# Patient Record
(demographics unavailable — no encounter records)

---

## 2024-11-07 NOTE — PHYSICAL EXAM
[de-identified] : Constitutional; Appears well, no apparent distress\par  Ability to communicate: Normal \par  Respiratory: non-labored breathing\par  Skin: No rash noted\par  Head: Normocephalic, atraumatic\par  Neck: no visible thyroid enlargement\par  Eyes: Extraocular movements intact\par  Neurologic: Alert and oriented x3\par  Psychiatric: normal mood, affect and behavior\par   [] : non-antalgic

## 2024-11-07 NOTE — DISCUSSION/SUMMARY
[de-identified] : After discussing various treatment options with the patient including but not limited to oral medications, physical therapy, exercise modalities as well as interventional spinal injections, we have decided with the following plan:  - Continue Home exercises, stretching, activity modification, physical therapy, and conservative care. - MRI report and/or images was reviewed and discussed with the patient. - Recommend L5-S1 Lumbar Epidural Steroid Injection under fluoroscopic guidance with image. - The risks, benefits and alternatives of the proposed procedure were explained in detail with the patient. The risks outlined include but are not limited to infection, bleeding, post-dural puncture headache, nerve injury, a temporary increase in pain, failure to resolve symptoms, allergic reaction, symptom recurrence, and possible elevation of blood sugar in diabetics. All questions were answered to patient's apparent satisfaction and he/she verbalized an understanding. - Patient is presenting with acute/sub-acute radicular pain with impairment in ADLs and functionality.  The pain has not responded to conservative care including NSAID therapy and/or physical therapy.  There is no bleeding tendency, unstable medical condition, or systemic infection. - Follow up in 1-2 weeks post injection for re-evaluation.

## 2024-11-07 NOTE — HISTORY OF PRESENT ILLNESS
[Lower back] : lower back [4] : 4 [Sharp] : sharp [Throbbing] : throbbing [Intermittent] : intermittent [Household chores] : household chores [Nothing helps with pain getting better] : Nothing helps with pain getting better [Standing] : standing [Physical therapy] : physical therapy [Radiating] : radiating [FreeTextEntry1] : 11/07/2024: pt is following up for L-spine pain. Pain is on the b/l lower back L>R and sometimes radiates down the leg.   05/23/2024: s/p L5-S1 LESI on 05/09/24 with 50% relief and improvement of ADLs. No longer has any pain down the leg but still has left lower back pain.  01/25/2024: s/p L5-S1 LESI on 01/11/24 with >80% relief and improvement of ADLs. Has been feeling much better since injection.   09/21/2023: s/p L5-S1 LESI on 09/07/23 with 70% relief and improvement of ADLs.   06/15/2023: s/p L5-S1 LESI on 06/01/23 with >80% relief and improvement of ADLs. Pain no longer radiates down the left leg but still gets left sided back pain with driving and sitting for longer periods.   Initial HPI 04/27/23: Pain started several years ago and is on the LEFT lower back and radiates down the left leg to the toes described as a numbness/tingling.   MRI Lumbar Spine 12/7/22 independently reviewed: L3-4 left HNP with left L3 compression; L5-S1 left HNP with left S1 impingement.  Conservative Care: waiting for approval.  Pain Medications: Tylenol/Advil PRN  Past Injections: none Spine surgery: none  Blood thinners: none  [] : no [de-identified] : L MRI  [FreeTextEntry7] : B/L LEG  [TWNoteComboBox1] : 50%

## 2024-12-05 NOTE — HISTORY OF PRESENT ILLNESS
[Lower back] : lower back [4] : 4 [Radiating] : radiating [Sharp] : sharp [Throbbing] : throbbing [Intermittent] : intermittent [Household chores] : household chores [Nothing helps with pain getting better] : Nothing helps with pain getting better [Standing] : standing [Physical therapy] : physical therapy [FreeTextEntry1] : 12/5/24: s/p L5-S1 LESI on 11/21/24 with >80% relief and improvement of ADLs. No pain down the leg. Overall pain is improved.  . 11/07/2024: pt is following up for L-spine pain. Pain is on the b/l lower back L>R and sometimes radiates down the leg.   05/23/2024: s/p L5-S1 LESI on 05/09/24 with 50% relief and improvement of ADLs. No longer has any pain down the leg but still has left lower back pain.  01/25/2024: s/p L5-S1 LESI on 01/11/24 with >80% relief and improvement of ADLs. Has been feeling much better since injection.   09/21/2023: s/p L5-S1 LESI on 09/07/23 with 70% relief and improvement of ADLs.   06/15/2023: s/p L5-S1 LESI on 06/01/23 with >80% relief and improvement of ADLs. Pain no longer radiates down the left leg but still gets left sided back pain with driving and sitting for longer periods.   Initial HPI 04/27/23: Pain started several years ago and is on the LEFT lower back and radiates down the left leg to the toes described as a numbness/tingling.   MRI Lumbar Spine 12/7/22 independently reviewed: L3-4 left HNP with left L3 compression; L5-S1 left HNP with left S1 impingement.  Conservative Care: waiting for approval.  Pain Medications: Tylenol/Advil PRN  Past Injections: none Spine surgery: none  Blood thinners: none  [] : no [FreeTextEntry7] : B/L LEG  [de-identified] : L MRI  [TWNoteComboBox1] : 50%

## 2024-12-05 NOTE — DISCUSSION/SUMMARY
[de-identified] : After discussing various treatment options with the patient including but not limited to oral medications, physical therapy, exercise modalities as well as interventional spinal injections, we have decided with the following plan:  - Continue Home exercises, stretching, activity modification, physical therapy, and conservative care. - MRI report and/or images was reviewed and discussed with the patient. - Recommend L5-S1 Lumbar Epidural Steroid Injection under fluoroscopic guidance with image. - The risks, benefits and alternatives of the proposed procedure were explained in detail with the patient. The risks outlined include but are not limited to infection, bleeding, post-dural puncture headache, nerve injury, a temporary increase in pain, failure to resolve symptoms, allergic reaction, symptom recurrence, and possible elevation of blood sugar in diabetics. All questions were answered to patient's apparent satisfaction and he/she verbalized an understanding. - Patient is presenting with acute/sub-acute radicular pain with impairment in ADLs and functionality.  The pain has not responded to conservative care including NSAID therapy and/or physical therapy.  There is no bleeding tendency, unstable medical condition, or systemic infection. - Follow up in 1-2 weeks post injection for re-evaluation. - Will call to schedule.

## 2024-12-05 NOTE — PHYSICAL EXAM
[de-identified] : Constitutional; Appears well, no apparent distress\par  Ability to communicate: Normal \par  Respiratory: non-labored breathing\par  Skin: No rash noted\par  Head: Normocephalic, atraumatic\par  Neck: no visible thyroid enlargement\par  Eyes: Extraocular movements intact\par  Neurologic: Alert and oriented x3\par  Psychiatric: normal mood, affect and behavior\par   [] : non-antalgic

## 2025-02-27 NOTE — PROCEDURE
[FreeTextEntry3] : Date of Service: 02/27/2025   Account: [id number]  Patient: DANII HAMILTON   YOB: 1984  Age: 40 year   Surgeon:                                                         Arthur Chamorro D.O.  Pre-Operative Diagnosis:                             Lumbosacral radiculitis  Post-Operative Diagnosis:                           Same  Procedure:                                                      Interlaminar lumbar epidural steroid injection (L5-S1) under fluoroscopic guidance  Anesthesia:                                                     Local with MAC   This procedure was carried out using fluoroscopic guidance.  The risks and benefits of the procedure were discussed extensively with the patient.  The consent of the patient was obtained and the following procedure was performed.  The patient was placed in the prone position.  The lumbar area was prepped and draped in a sterile fashion.  A timeout was performed with all essential staff present and the site and side were verified. Under AP view with slight cephalad-caudad angulation, the L5-S1 interspace was identified and marked.  Using sterile technique, the superficial skin was anesthetized with 1% Lidocaine without epinephrine.  A 20-gauge Tuohy needle was advanced into the epidural space under fluoroscopy using [id number]-[id number] technique and using loss of resistance at the L5-S1 level.  After negative aspiration for heme or CSF, an epidurogram was obtained using 2-3 cc Omnipaque contrast injected under live fluoroscopy, confirming epidural placement of the needle.    Epidurogram showed no evidence of intrathecal or intravascular flow, and good evidence of bilateral epidural flow from L3-S2 levels.  After this, 4 cc of preservative free normal saline plus 12 mg of betamethasone were injected into the epidural space.  The needle was subsequently removed.  Anesthesia personnel were present throughout the procedure.  The patient tolerated the procedure well and was instructed to contact me immediately if there were any problems.  Arthur Chaomrro D.O.

## 2025-03-13 NOTE — HISTORY OF PRESENT ILLNESS
[Lower back] : lower back [4] : 4 [Radiating] : radiating [Sharp] : sharp [Throbbing] : throbbing [Intermittent] : intermittent [Household chores] : household chores [Nothing helps with pain getting better] : Nothing helps with pain getting better [Standing] : standing [Physical therapy] : physical therapy [FreeTextEntry1] : 03/13/2025: s/p L5-S1 LESI on 02/27/25 with 50% relief and improvement of ADLs. Pain now just on the left lower back but less severe.   12/5/24: s/p L5-S1 LESI on 11/21/24 with >80% relief and improvement of ADLs. No pain down the leg. Overall pain is improved.  . 11/07/2024: pt is following up for L-spine pain. Pain is on the b/l lower back L>R and sometimes radiates down the leg.   05/23/2024: s/p L5-S1 LESI on 05/09/24 with 50% relief and improvement of ADLs. No longer has any pain down the leg but still has left lower back pain.  01/25/2024: s/p L5-S1 LESI on 01/11/24 with >80% relief and improvement of ADLs. Has been feeling much better since injection.   09/21/2023: s/p L5-S1 LESI on 09/07/23 with 70% relief and improvement of ADLs.   06/15/2023: s/p L5-S1 LESI on 06/01/23 with >80% relief and improvement of ADLs. Pain no longer radiates down the left leg but still gets left sided back pain with driving and sitting for longer periods.   Initial HPI 04/27/23: Pain started several years ago and is on the LEFT lower back and radiates down the left leg to the toes described as a numbness/tingling.   MRI Lumbar Spine 12/7/22 independently reviewed: L3-4 left HNP with left L3 compression; L5-S1 left HNP with left S1 impingement.  Conservative Care: waiting for approval.  Pain Medications: Tylenol/Advil PRN  Past Injections: none Spine surgery: none  Blood thinners: none  [] : no [FreeTextEntry7] : B/L LEG  [de-identified] : L MRI  [TWNoteComboBox1] : 50%

## 2025-03-13 NOTE — HISTORY OF PRESENT ILLNESS
[Lower back] : lower back [4] : 4 [Radiating] : radiating [Sharp] : sharp [Throbbing] : throbbing [Intermittent] : intermittent [Household chores] : household chores [Nothing helps with pain getting better] : Nothing helps with pain getting better [Standing] : standing [Physical therapy] : physical therapy [FreeTextEntry1] : 03/13/2025: s/p L5-S1 LESI on 02/27/25 with 50% relief and improvement of ADLs. Pain now just on the left lower back but less severe.   12/5/24: s/p L5-S1 LESI on 11/21/24 with >80% relief and improvement of ADLs. No pain down the leg. Overall pain is improved.  . 11/07/2024: pt is following up for L-spine pain. Pain is on the b/l lower back L>R and sometimes radiates down the leg.   05/23/2024: s/p L5-S1 LESI on 05/09/24 with 50% relief and improvement of ADLs. No longer has any pain down the leg but still has left lower back pain.  01/25/2024: s/p L5-S1 LESI on 01/11/24 with >80% relief and improvement of ADLs. Has been feeling much better since injection.   09/21/2023: s/p L5-S1 LESI on 09/07/23 with 70% relief and improvement of ADLs.   06/15/2023: s/p L5-S1 LESI on 06/01/23 with >80% relief and improvement of ADLs. Pain no longer radiates down the left leg but still gets left sided back pain with driving and sitting for longer periods.   Initial HPI 04/27/23: Pain started several years ago and is on the LEFT lower back and radiates down the left leg to the toes described as a numbness/tingling.   MRI Lumbar Spine 12/7/22 independently reviewed: L3-4 left HNP with left L3 compression; L5-S1 left HNP with left S1 impingement.  Conservative Care: waiting for approval.  Pain Medications: Tylenol/Advil PRN  Past Injections: none Spine surgery: none  Blood thinners: none  [] : no [FreeTextEntry7] : B/L LEG  [de-identified] : L MRI  [TWNoteComboBox1] : 50%

## 2025-03-13 NOTE — PHYSICAL EXAM
[de-identified] : Constitutional; Appears well, no apparent distress\par  Ability to communicate: Normal \par  Respiratory: non-labored breathing\par  Skin: No rash noted\par  Head: Normocephalic, atraumatic\par  Neck: no visible thyroid enlargement\par  Eyes: Extraocular movements intact\par  Neurologic: Alert and oriented x3\par  Psychiatric: normal mood, affect and behavior\par   [] : non-antalgic

## 2025-03-13 NOTE — DISCUSSION/SUMMARY
[de-identified] : After discussing various treatment options with the patient including but not limited to oral medications, physical therapy, exercise modalities as well as interventional spinal injections, we have decided with the following plan:  - Continue Home exercises, stretching, activity modification, physical therapy, and conservative care. - MRI report and/or images was reviewed and discussed with the patient. - Recommend L5-S1 Lumbar Epidural Steroid Injection under fluoroscopic guidance with image. (left)  - The risks, benefits and alternatives of the proposed procedure were explained in detail with the patient. The risks outlined include but are not limited to infection, bleeding, post-dural puncture headache, nerve injury, a temporary increase in pain, failure to resolve symptoms, allergic reaction, symptom recurrence, and possible elevation of blood sugar in diabetics. All questions were answered to patient's apparent satisfaction and he/she verbalized an understanding. - Patient is presenting with acute/sub-acute radicular pain with impairment in ADLs and functionality.  The pain has not responded to conservative care including NSAID therapy and/or physical therapy.  There is no bleeding tendency, unstable medical condition, or systemic infection. - Follow up in 1-2 weeks post injection for re-evaluation. - Will call to schedule.

## 2025-03-13 NOTE — PHYSICAL EXAM
[de-identified] : Constitutional; Appears well, no apparent distress\par  Ability to communicate: Normal \par  Respiratory: non-labored breathing\par  Skin: No rash noted\par  Head: Normocephalic, atraumatic\par  Neck: no visible thyroid enlargement\par  Eyes: Extraocular movements intact\par  Neurologic: Alert and oriented x3\par  Psychiatric: normal mood, affect and behavior\par   [] : non-antalgic

## 2025-03-13 NOTE — DISCUSSION/SUMMARY
[de-identified] : After discussing various treatment options with the patient including but not limited to oral medications, physical therapy, exercise modalities as well as interventional spinal injections, we have decided with the following plan:  - Continue Home exercises, stretching, activity modification, physical therapy, and conservative care. - MRI report and/or images was reviewed and discussed with the patient. - Recommend L5-S1 Lumbar Epidural Steroid Injection under fluoroscopic guidance with image. (left)  - The risks, benefits and alternatives of the proposed procedure were explained in detail with the patient. The risks outlined include but are not limited to infection, bleeding, post-dural puncture headache, nerve injury, a temporary increase in pain, failure to resolve symptoms, allergic reaction, symptom recurrence, and possible elevation of blood sugar in diabetics. All questions were answered to patient's apparent satisfaction and he/she verbalized an understanding. - Patient is presenting with acute/sub-acute radicular pain with impairment in ADLs and functionality.  The pain has not responded to conservative care including NSAID therapy and/or physical therapy.  There is no bleeding tendency, unstable medical condition, or systemic infection. - Follow up in 1-2 weeks post injection for re-evaluation. - Will call to schedule.
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
## 2025-04-17 NOTE — PHYSICAL EXAM
[Normal] : Gait: normal [SLR] : positive straight leg raise [LE] : Sensory: Intact in bilateral lower extremities [1+] : left ankle jerk 1+ [DP] : dorsalis pedis 2+ and symmetric bilaterally [Plantar Reflex Right Only] : absent on the right [Plantar Reflex Left Only] : absent on the left [DTR Reflexes Clonus Of Right Ankle (___ Beats)] : absent on the right [DTR Reflexes Clonus Of Left Ankle (___ Beats)] : absent on the left [de-identified] : left gluteal tenderness psoriatic patches legs and arms, extensor surface left knee [de-identified] : 3 views lumbar spine obtained today demonstrate slight trunk shift to the left.  Normal lumbar lordosis.  Trace retrolisthesis L5-S1.  Degenerative changes with some endplate sclerosis at the L2-3 level.  No dynamic instability between flexion-extension.  AP pelvis demonstrates normal appearance of bilaterally.  No acute fractures.  No significant degeneration

## 2025-04-17 NOTE — DISCUSSION/SUMMARY
[Medication Risks Reviewed] : Medication risks reviewed [de-identified] : Assessment: - Summary : The patient's symptoms point to a significant degenerative issue with his lower back, specifically a left-sided herniation that may be causing his symptoms.  Degenerative arthritis in the thoracolumbar spine may also be contributing to the problem. - Problems : - Chronic back pain  - Left-sided herniation  - Wear and tear arthritis  - Differential Diagnosis : - Sciatica  - Lumbar Strain  - Lumbar Disk Disorder  Plan: - Summary : Plan to order updated MRI and based on results, discuss potential surgical treatments including a possible microdiscectomy. The patient is also advised to follow-up with his dermatologist for his psoriasis. - Plan : - Order an updated MRI lumbar spine  - Patient to follow-up with dermatologist for psoriasis treatment  - Revisit following review of updated MRI results to discuss options including potential surgery  The patient was educated regarding their condition, treatment options as well as prescribed course of treatment.  Risks and benefits as well as alternatives to the proposed treatment were also provided to the patient  They were given the opportunity to have all their questions answered to their satisfaction.  Vital signs were reviewed with the patient and the patient was instructed to followup with their primary care provider for further management. There were no PAs or scribes used in the evaluation, exam or treatment plan discussion. The surgeon was the primary evaluating or treating physician as noted above.

## 2025-04-17 NOTE — DISCUSSION/SUMMARY
[Medication Risks Reviewed] : Medication risks reviewed [de-identified] : Assessment: - Summary : The patient's symptoms point to a significant degenerative issue with his lower back, specifically a left-sided herniation that may be causing his symptoms.  Degenerative arthritis in the thoracolumbar spine may also be contributing to the problem. - Problems : - Chronic back pain  - Left-sided herniation  - Wear and tear arthritis  - Differential Diagnosis : - Sciatica  - Lumbar Strain  - Lumbar Disk Disorder  Plan: - Summary : Plan to order updated MRI and based on results, discuss potential surgical treatments including a possible microdiscectomy. The patient is also advised to follow-up with his dermatologist for his psoriasis. - Plan : - Order an updated MRI lumbar spine  - Patient to follow-up with dermatologist for psoriasis treatment  - Revisit following review of updated MRI results to discuss options including potential surgery  The patient was educated regarding their condition, treatment options as well as prescribed course of treatment.  Risks and benefits as well as alternatives to the proposed treatment were also provided to the patient  They were given the opportunity to have all their questions answered to their satisfaction.  Vital signs were reviewed with the patient and the patient was instructed to followup with their primary care provider for further management. There were no PAs or scribes used in the evaluation, exam or treatment plan discussion. The surgeon was the primary evaluating or treating physician as noted above.

## 2025-04-17 NOTE — PHYSICAL EXAM
[Normal] : Gait: normal [SLR] : positive straight leg raise [LE] : Sensory: Intact in bilateral lower extremities [1+] : left ankle jerk 1+ [DP] : dorsalis pedis 2+ and symmetric bilaterally [Plantar Reflex Right Only] : absent on the right [Plantar Reflex Left Only] : absent on the left [DTR Reflexes Clonus Of Right Ankle (___ Beats)] : absent on the right [DTR Reflexes Clonus Of Left Ankle (___ Beats)] : absent on the left [de-identified] : left gluteal tenderness psoriatic patches legs and arms, extensor surface left knee [de-identified] : 3 views lumbar spine obtained today demonstrate slight trunk shift to the left.  Normal lumbar lordosis.  Trace retrolisthesis L5-S1.  Degenerative changes with some endplate sclerosis at the L2-3 level.  No dynamic instability between flexion-extension.  AP pelvis demonstrates normal appearance of bilaterally.  No acute fractures.  No significant degeneration

## 2025-05-23 NOTE — DISCUSSION/SUMMARY
[Medication Risks Reviewed] : Medication risks reviewed [de-identified] : Assessment: - Summary : Patient presents with lumbar radiculopathy due to L5-S1 disc herniation, confirmed by MRI. Patient has experienced temporary relief from epidural injection but anticipates symptom recurrence. The patient also has early degenerative changes in the lumbar spine. - Problems : - Lumbar radiculopathy  - L5-S1 disc herniation  - Early lumbar degenerative disc disease  - Differential Diagnosis : - Lumbar spinal stenosis  - Facet joint arthropathy  - Sacroiliac joint dysfunction  Plan: - Summary : After discussing treatment options, including continued conservative management and surgical intervention, the patient has decided to proceed with microdiscectomy (disc shaving) for the L4-L5 herniation. I have explained the procedure, expected outcomes, and potential risks, including the possibility of reherniation. Post-operative rehabilitation and activity modifications were also discussed. - Plan : - Schedule microdiscectomy for left L5-S1 herniation  - Obtain insurance authorization for surgery  - Provide patient with pre-operative information packet  - Arrange post-operative home physical therapy  - Educate patient on post-operative activity restrictions and proper body mechanics  - Follow-up appointment to be scheduled post-operatively  The patient was advised that based upon their clinical presentation and radiographic findings they meet inclusion criteria for spinal surgery to address their spinal pathology. Specifically, they have tried medications, therapy and/or injections without relief of symptoms. Given the duration of symptoms and failure to respond to non surgical treatments surgery was offered to the patient and they have decided to proceed with surgery.  The spectrum of treatments for their spinal condition were reviewed in detail. The goals, alternatives, risks and benefits of spinal surgery were reviewed in detail with the patient.    Benefits and risks of operative and nonoperative treatment for the patient's pathology were outlined at length with the patient.  Specifically, those risks are understood to be, but not limited to, bleeding, infection, fracture (both during surgery and after surgery), adjacent level disease, failure to heal (significantly increased by smoking), need for further surgery, failure of instrumentation (if used), recurrence of problem and symptoms, neurovascular injury, dural tears or leaks resulting in spinal fluid leakage and requiring additional invasive and non-invasive treatments, need for additional procedures, possible paralysis resulting in loss of use of arms, legs, bowel and bladder function as well as sexual dysfunction, and anesthetic risks including death.  Available alternatives to surgery were also discussed with the patient. In addition, the patient further understands that there may be indicated need for somatosensory evoked potential monitoring, real-time EMG monitoring, and motor evoked potential monitoring during the procedure along with placement of needle electrodes. A neuromonitoring service will discuss the risks and benefits separately with the patient. The patient also understands that having a surgical procedure and being hospitalized carries risks in addition to the ones described above.  The patient was advised that Dr. Porter operates as a surgical team with his associate Dr. Mccarthy and/or with surgical Physician Assistants (PA)  The patient was advised that they will require a medical preoperative risk evaluation by their PCP. Further medical subspecialty clearances such as cardiac may be indicated if felt needed by their PCP.  The patient was advised he/she may call our office after careful consideration of their treatment options and further consultation with any other physician to begin the process of preoperative planning for elective spinal surgery at a time of their choosing.  The patient verbalized an understanding of the procedure, its indications, and its common potential complications and attendant risks, and is willing to proceed. No guarantees were made with regard to a complete recovery. We will proceed in a timely manner after obtaining medical clearance.

## 2025-05-23 NOTE — PHYSICAL EXAM
[Normal] : Gait: normal [SLR] : positive straight leg raise [LE] : Sensory: Intact in bilateral lower extremities [1+] : left ankle jerk 1+ [DP] : dorsalis pedis 2+ and symmetric bilaterally [Plantar Reflex Right Only] : absent on the right [Plantar Reflex Left Only] : absent on the left [DTR Reflexes Clonus Of Right Ankle (___ Beats)] : absent on the right [DTR Reflexes Clonus Of Left Ankle (___ Beats)] : absent on the left [de-identified] : left gluteal tenderness psoriatic patches legs and arms, extensor surface left knee [de-identified] : EXAM: [id number] - MR SPINE LUMBAR - ORDERED BY: NATHAN BAUGH  PROCEDURE DATE: 04/24/2025  INTERPRETATION: CLINICAL INFORMATION: Left leg pain.  ADDITIONAL CLINICAL INFORMATION: Low back muscle strain S39.012A  TECHNIQUE: Multiplanar, multisequence MRI was performed of the lumbar spine. IV Contrast: NONE  PRIOR STUDIES: No priors available for comparison.  FINDINGS:  LOCALIZER: No additional findings. BONES: There is no fracture. There are Modic type I changes at the L4-L5 and L5-S1 level. DISCS: There is loss of normal T2 disc signal at the L2-L3, L4-5, L5-S1 level. ALIGNMENT: There is straightening of the lumbar spine. SACROILIAC JOINTS/SACRUM: There is no sacral fracture. The SI joints are partially visualized but are intact. CONUS AND CAUDA EQUINA: The distal cord and conus are normal in signal. Conus terminates at L1-L2. VISUALIZED INTRAPELVIC/INTRA-ABDOMINAL SOFT TISSUES: Normal. PARASPINAL SOFT TISSUES: Normal.   INDIVIDUAL LEVELS:  LOWER THORACIC SPINE: No spinal canal or neuroforaminal stenosis.  L1-L2: No spinal canal or neuroforaminal stenosis. L2-L3: Mild broad-based disc bulge and bilateral facet arthropathy results in mild left greater than right neural foraminal stenosis. No spinal canal stenosis. L3-L4: Broad-based disc bulging, asymmetric to the left with mild bilateral facet arthropathy results in mild to moderate right and moderate left neural foraminal stenosis. No spinal canal stenosis. L4-L5: Mild broad-based disc bulging and bilateral facet arthropathy results in flattening the ventral thecal sac and moderate left greater than right neural foraminal stenosis. No spinal canal stenosis. L5-S1: Broad-based disc bulging with superimposed left paracentral disc protrusion which abuts the traversing left S1 nerve at the left lateral recess. Mild bilateral facet arthropathy. Moderate left greater than right neural foraminal stenosis.   IMPRESSION:  MRI of the lumbar spine demonstrates multilevel spondylosis and facet arthropathy.  At L3-L4 there is mild/moderate right and moderate left neural foraminal stenosis.  At L4-L5, there is moderate left greater than right neural foraminal stenosis.  At L5-S1, there is a left paracentral disc protrusion which abuts the traversing left S1 nerve at the left lateral recess and moderate left greater than right neural foraminal stenosis.  --- End of Report ---       ALEXIS HUERTA MD; Attending Radiologist This document has been electronically signed. Apr 26 2025 12:52PM

## 2025-05-23 NOTE — HISTORY OF PRESENT ILLNESS
[de-identified] : - Summary : Patient presents for follow-up of lower back pain radiating to left thigh, worse with lifting. Reports some relief after recent epidural injection but anticipates symptoms returning in a few months. - Chief Complaint (CC) : Follow-up for lower back pain with left leg radiation - History of Present Illness (HPI) : Patient was last seen on April 16th, 2025, for lower back pain radiating to the left thigh, exacerbated by lifting. Patient reports some relief following a recent epidural injection received at the end of February. However, the patient anticipates symptoms will return to baseline in a month or two. The patient is not currently taking any medications for back or leg pain and has not tried medications such as gabapentin. Patient expresses concern about the potential for recurrence of disc herniation after surgical intervention. - Past Medical History : - Past Surgical History : - Family History : - Social History : - Patient has children and a mother living at home  - Patient has work responsibilities  - Patient has been doing house work recently  - Other History : - Review of Systems : - Medications : - Allergies : - No Known Drug Allergies

## 2025-05-23 NOTE — REASON FOR VISIT
[Follow-Up Visit] : a follow-up visit for [Back Pain] : back pain [FreeTextEntry2] : Lumbar herniated disc

## 2025-07-03 NOTE — PHYSICAL EXAM
[de-identified] : Constitutional; Appears well, no apparent distress\par  Ability to communicate: Normal \par  Respiratory: non-labored breathing\par  Skin: No rash noted\par  Head: Normocephalic, atraumatic\par  Neck: no visible thyroid enlargement\par  Eyes: Extraocular movements intact\par  Neurologic: Alert and oriented x3\par  Psychiatric: normal mood, affect and behavior\par   [] : non-antalgic

## 2025-07-03 NOTE — HISTORY OF PRESENT ILLNESS
[Lower back] : lower back [4] : 4 [Radiating] : radiating [Sharp] : sharp [Throbbing] : throbbing [Intermittent] : intermittent [Household chores] : household chores [Nothing helps with pain getting better] : Nothing helps with pain getting better [Standing] : standing [Physical therapy] : physical therapy [TWNoteComboBox1] : 50% [FreeTextEntry1] : 07/03/2025: Pain is on the left lower back with occasional radiation down the left leg. Will be have a L5-S1 microdiscectomy on 9/16/25 with Dr. Porter.   MRI L-spine 4/24/25 independently reviewed: L5-S1 LEFT HNP which abuts the traversing left S1 nerve at the left LR and moderate L>R NF stenosis  03/13/2025: s/p L5-S1 LESI on 02/27/25 with 50% relief and improvement of ADLs. Pain now just on the left lower back but less severe.   12/5/24: s/p L5-S1 LESI on 11/21/24 with >80% relief and improvement of ADLs. No pain down the leg. Overall pain is improved.  . 11/07/2024: pt is following up for L-spine pain. Pain is on the b/l lower back L>R and sometimes radiates down the leg.   05/23/2024: s/p L5-S1 LESI on 05/09/24 with 50% relief and improvement of ADLs. No longer has any pain down the leg but still has left lower back pain.  01/25/2024: s/p L5-S1 LESI on 01/11/24 with >80% relief and improvement of ADLs. Has been feeling much better since injection.   09/21/2023: s/p L5-S1 LESI on 09/07/23 with 70% relief and improvement of ADLs.   06/15/2023: s/p L5-S1 LESI on 06/01/23 with >80% relief and improvement of ADLs. Pain no longer radiates down the left leg but still gets left sided back pain with driving and sitting for longer periods.   Initial HPI 04/27/23: Pain started several years ago and is on the LEFT lower back and radiates down the left leg to the toes described as a numbness/tingling.   MRI Lumbar Spine 12/7/22 independently reviewed: L3-4 left HNP with left L3 compression; L5-S1 left HNP with left S1 impingement.  Conservative Care: waiting for approval.  Pain Medications: Tylenol/Advil PRN  Past Injections: none Spine surgery: none  Blood thinners: none     [] : no [FreeTextEntry7] : B/L LEG  [de-identified] : L MRI

## 2025-07-03 NOTE — PHYSICAL EXAM
[de-identified] : Constitutional; Appears well, no apparent distress\par  Ability to communicate: Normal \par  Respiratory: non-labored breathing\par  Skin: No rash noted\par  Head: Normocephalic, atraumatic\par  Neck: no visible thyroid enlargement\par  Eyes: Extraocular movements intact\par  Neurologic: Alert and oriented x3\par  Psychiatric: normal mood, affect and behavior\par   [] : non-antalgic

## 2025-07-03 NOTE — HISTORY OF PRESENT ILLNESS
[Lower back] : lower back [4] : 4 [Radiating] : radiating [Sharp] : sharp [Throbbing] : throbbing [Intermittent] : intermittent [Household chores] : household chores [Nothing helps with pain getting better] : Nothing helps with pain getting better [Standing] : standing [Physical therapy] : physical therapy [TWNoteComboBox1] : 50% [FreeTextEntry1] : 07/03/2025: Pain is on the left lower back with occasional radiation down the left leg. Will be have a L5-S1 microdiscectomy on 9/16/25 with Dr. Porter.   MRI L-spine 4/24/25 independently reviewed: L5-S1 LEFT HNP which abuts the traversing left S1 nerve at the left LR and moderate L>R NF stenosis  03/13/2025: s/p L5-S1 LESI on 02/27/25 with 50% relief and improvement of ADLs. Pain now just on the left lower back but less severe.   12/5/24: s/p L5-S1 LESI on 11/21/24 with >80% relief and improvement of ADLs. No pain down the leg. Overall pain is improved.  . 11/07/2024: pt is following up for L-spine pain. Pain is on the b/l lower back L>R and sometimes radiates down the leg.   05/23/2024: s/p L5-S1 LESI on 05/09/24 with 50% relief and improvement of ADLs. No longer has any pain down the leg but still has left lower back pain.  01/25/2024: s/p L5-S1 LESI on 01/11/24 with >80% relief and improvement of ADLs. Has been feeling much better since injection.   09/21/2023: s/p L5-S1 LESI on 09/07/23 with 70% relief and improvement of ADLs.   06/15/2023: s/p L5-S1 LESI on 06/01/23 with >80% relief and improvement of ADLs. Pain no longer radiates down the left leg but still gets left sided back pain with driving and sitting for longer periods.   Initial HPI 04/27/23: Pain started several years ago and is on the LEFT lower back and radiates down the left leg to the toes described as a numbness/tingling.   MRI Lumbar Spine 12/7/22 independently reviewed: L3-4 left HNP with left L3 compression; L5-S1 left HNP with left S1 impingement.  Conservative Care: waiting for approval.  Pain Medications: Tylenol/Advil PRN  Past Injections: none Spine surgery: none  Blood thinners: none     [] : no [FreeTextEntry7] : B/L LEG  [de-identified] : L MRI

## 2025-07-03 NOTE — DISCUSSION/SUMMARY
[de-identified] : After discussing various treatment options with the patient including but not limited to oral medications, physical therapy, exercise modalities as well as interventional spinal injections, we have decided with the following plan:  - Continue Home exercises, stretching, activity modification, physical therapy, and conservative care. - MRI report and/or images was reviewed and discussed with the patient. - Recommend L5-S1 Lumbar Epidural Steroid Injection under fluoroscopic guidance with image. (left)  - The risks, benefits and alternatives of the proposed procedure were explained in detail with the patient. The risks outlined include but are not limited to infection, bleeding, post-dural puncture headache, nerve injury, a temporary increase in pain, failure to resolve symptoms, allergic reaction, symptom recurrence, and possible elevation of blood sugar in diabetics. All questions were answered to patient's apparent satisfaction and he/she verbalized an understanding. - Patient is presenting with acute/sub-acute radicular pain with impairment in ADLs and functionality.  The pain has not responded to conservative care including NSAID therapy and/or physical therapy.  There is no bleeding tendency, unstable medical condition, or systemic infection. - Follow up in 1-2 weeks post injection for re-evaluation.

## 2025-07-03 NOTE — DISCUSSION/SUMMARY
[de-identified] : After discussing various treatment options with the patient including but not limited to oral medications, physical therapy, exercise modalities as well as interventional spinal injections, we have decided with the following plan:  - Continue Home exercises, stretching, activity modification, physical therapy, and conservative care. - MRI report and/or images was reviewed and discussed with the patient. - Recommend L5-S1 Lumbar Epidural Steroid Injection under fluoroscopic guidance with image. (left)  - The risks, benefits and alternatives of the proposed procedure were explained in detail with the patient. The risks outlined include but are not limited to infection, bleeding, post-dural puncture headache, nerve injury, a temporary increase in pain, failure to resolve symptoms, allergic reaction, symptom recurrence, and possible elevation of blood sugar in diabetics. All questions were answered to patient's apparent satisfaction and he/she verbalized an understanding. - Patient is presenting with acute/sub-acute radicular pain with impairment in ADLs and functionality.  The pain has not responded to conservative care including NSAID therapy and/or physical therapy.  There is no bleeding tendency, unstable medical condition, or systemic infection. - Follow up in 1-2 weeks post injection for re-evaluation.

## 2025-07-17 NOTE — PROCEDURE
[FreeTextEntry3] : Date of Service: 07/17/2025   Account: [id number]  Patient: DANII HAMILTON   YOB: 1984  Age: 40 year   Surgeon:                                                         Arthur Chamorro D.O.  Pre-Operative Diagnosis:                             Lumbosacral radiculitis  Post-Operative Diagnosis:                           Same  Procedure:                                                      Interlaminar lumbar epidural steroid injection (L5-S1) under fluoroscopic guidance  Anesthesia:                                                     Local with MAC   This procedure was carried out using fluoroscopic guidance.  The risks and benefits of the procedure were discussed extensively with the patient.  The consent of the patient was obtained and the following procedure was performed.  The patient was placed in the prone position.  The lumbar area was prepped and draped in a sterile fashion.  A timeout was performed with all essential staff present and the site and side were verified. Under AP view with slight cephalad-caudad angulation, the L5-S1 interspace was identified and marked.  Using sterile technique, the superficial skin was anesthetized with 1% Lidocaine without epinephrine.  A 20-gauge Tuohy needle was advanced into the epidural space under fluoroscopy using hlwft-gepvhefvs-pwwue technique and using loss of resistance at the L5-S1 level.  After negative aspiration for heme or CSF, an epidurogram was obtained using 2-3 cc Omnipaque contrast injected under live fluoroscopy, confirming epidural placement of the needle.    Epidurogram showed no evidence of intrathecal or intravascular flow, and good evidence of bilateral epidural flow from L3-S2 levels.  After this, 4 cc of preservative free normal saline plus 12 mg of betamethasone were injected into the epidural space.  The needle was subsequently removed.  Anesthesia personnel were present throughout the procedure.  The patient tolerated the procedure well and was instructed to contact me immediately if there were any problems.  Arthur Chamorro D.O.

## 2025-07-30 NOTE — PHYSICAL EXAM
[de-identified] : Constitutional; Appears well, no apparent distress\par  Ability to communicate: Normal \par  Respiratory: non-labored breathing\par  Skin: No rash noted\par  Head: Normocephalic, atraumatic\par  Neck: no visible thyroid enlargement\par  Eyes: Extraocular movements intact\par  Neurologic: Alert and oriented x3\par  Psychiatric: normal mood, affect and behavior\par   [] : non-antalgic

## 2025-07-30 NOTE — PHYSICAL EXAM
Dear Deborah,     My name is THAO Suarez, and I recently had the pleasure of evaluating information related to your medical condition on our [facility] digital platform. Based on the information available, I have determined that your request for care was unable to be safely and effectively completed. Due to this, it is my recommendation that you seek care at a local Urgent Care or Immediate Care Center for an expedited and thorough face-to-face evaluation and consideration for diagnostic testing or Emergency department if urgent care is unavailable to you.     Please note that the [facility] department that you submitted your request for care is equivalent to a virtual format of Urgent Care or Immediate Care level of care. If you did not intend to seek this particular level of care and potentially had a scheduled appointment with another provider, we recommend that you directly call the office of the provider you are attempting to meet with to rectify connection issues.     Your care is very important to us. Please do not delay in acting on the recommendations for your care listed above. If you feel you may be experiencing a medical emergency, contact 911 immediately. If you have any questions regarding your visit, you may contact us at [phone].     Thank You,     THAO Suarez    [de-identified] : Constitutional; Appears well, no apparent distress\par  Ability to communicate: Normal \par  Respiratory: non-labored breathing\par  Skin: No rash noted\par  Head: Normocephalic, atraumatic\par  Neck: no visible thyroid enlargement\par  Eyes: Extraocular movements intact\par  Neurologic: Alert and oriented x3\par  Psychiatric: normal mood, affect and behavior\par   [] : non-antalgic

## 2025-07-30 NOTE — DISCUSSION/SUMMARY
[de-identified] : After discussing various treatment options with the patient including but not limited to oral medications, physical therapy, exercise modalities as well as interventional spinal injections, we have decided with the following plan:  - Continue Home exercises, stretching, activity modification, physical therapy, and conservative care. - MRI report and/or images was reviewed and discussed with the patient. - Recommend L5-S1 Lumbar Epidural Steroid Injection under fluoroscopic guidance with image. (left)  - The risks, benefits and alternatives of the proposed procedure were explained in detail with the patient. The risks outlined include but are not limited to infection, bleeding, post-dural puncture headache, nerve injury, a temporary increase in pain, failure to resolve symptoms, allergic reaction, symptom recurrence, and possible elevation of blood sugar in diabetics. All questions were answered to patient's apparent satisfaction and he/she verbalized an understanding. - Patient is presenting with acute/sub-acute radicular pain with impairment in ADLs and functionality.  The pain has not responded to conservative care including NSAID therapy and/or physical therapy.  There is no bleeding tendency, unstable medical condition, or systemic infection. - Follow up in 1-2 weeks post injection for re-evaluation.

## 2025-07-30 NOTE — REASON FOR VISIT
Detail Level: Detailed Quality 110: Preventive Care And Screening: Influenza Immunization: Influenza Immunization Administered during Influenza season [Follow-Up Visit] : a follow-up pain management visit

## 2025-07-30 NOTE — HISTORY OF PRESENT ILLNESS
[FreeTextEntry1] : 07/31/2025 : s/p L5-1 LESI on 07/17/25 with 50% relief and improvement of ADLS 07/03/2025: Pain is on the left lower back with occasional radiation down the left leg. Will be have a L5-S1 microdiscectomy on 9/16/25 with Dr. Porter.   MRI L-spine 4/24/25 independently reviewed: L5-S1 LEFT HNP which abuts the traversing left S1 nerve at the left LR and moderate L>R NF stenosis  03/13/2025: s/p L5-S1 LESI on 02/27/25 with 50% relief and improvement of ADLs. Pain now just on the left lower back but less severe.   12/5/24: s/p L5-S1 LESI on 11/21/24 with >80% relief and improvement of ADLs. No pain down the leg. Overall pain is improved.  . 11/07/2024: pt is following up for L-spine pain. Pain is on the b/l lower back L>R and sometimes radiates down the leg.   05/23/2024: s/p L5-S1 LESI on 05/09/24 with 50% relief and improvement of ADLs. No longer has any pain down the leg but still has left lower back pain.  01/25/2024: s/p L5-S1 LESI on 01/11/24 with >80% relief and improvement of ADLs. Has been feeling much better since injection.   09/21/2023: s/p L5-S1 LESI on 09/07/23 with 70% relief and improvement of ADLs.   06/15/2023: s/p L5-S1 LESI on 06/01/23 with >80% relief and improvement of ADLs. Pain no longer radiates down the left leg but still gets left sided back pain with driving and sitting for longer periods.   Initial HPI 04/27/23: Pain started several years ago and is on the LEFT lower back and radiates down the left leg to the toes described as a numbness/tingling.   MRI Lumbar Spine 12/7/22 independently reviewed: L3-4 left HNP with left L3 compression; L5-S1 left HNP with left S1 impingement.  Conservative Care: waiting for approval.  Pain Medications: Tylenol/Advil PRN  Past Injections: none Spine surgery: none  Blood thinners: none      [Lower back] : lower back [4] : 4 [Radiating] : radiating [Sharp] : sharp [Throbbing] : throbbing [Intermittent] : intermittent [Household chores] : household chores [Nothing helps with pain getting better] : Nothing helps with pain getting better [Standing] : standing [Physical therapy] : physical therapy [] : no [FreeTextEntry7] : B/L LEG  [de-identified] : L MRI

## 2025-07-30 NOTE — HISTORY OF PRESENT ILLNESS
[FreeTextEntry1] : 07/31/2025 : s/p L5-1 LESI on 07/17/25 with 50% relief and improvement of ADLS 07/03/2025: Pain is on the left lower back with occasional radiation down the left leg. Will be have a L5-S1 microdiscectomy on 9/16/25 with Dr. Porter.   MRI L-spine 4/24/25 independently reviewed: L5-S1 LEFT HNP which abuts the traversing left S1 nerve at the left LR and moderate L>R NF stenosis  03/13/2025: s/p L5-S1 LESI on 02/27/25 with 50% relief and improvement of ADLs. Pain now just on the left lower back but less severe.   12/5/24: s/p L5-S1 LESI on 11/21/24 with >80% relief and improvement of ADLs. No pain down the leg. Overall pain is improved.  . 11/07/2024: pt is following up for L-spine pain. Pain is on the b/l lower back L>R and sometimes radiates down the leg.   05/23/2024: s/p L5-S1 LESI on 05/09/24 with 50% relief and improvement of ADLs. No longer has any pain down the leg but still has left lower back pain.  01/25/2024: s/p L5-S1 LESI on 01/11/24 with >80% relief and improvement of ADLs. Has been feeling much better since injection.   09/21/2023: s/p L5-S1 LESI on 09/07/23 with 70% relief and improvement of ADLs.   06/15/2023: s/p L5-S1 LESI on 06/01/23 with >80% relief and improvement of ADLs. Pain no longer radiates down the left leg but still gets left sided back pain with driving and sitting for longer periods.   Initial HPI 04/27/23: Pain started several years ago and is on the LEFT lower back and radiates down the left leg to the toes described as a numbness/tingling.   MRI Lumbar Spine 12/7/22 independently reviewed: L3-4 left HNP with left L3 compression; L5-S1 left HNP with left S1 impingement.  Conservative Care: waiting for approval.  Pain Medications: Tylenol/Advil PRN  Past Injections: none Spine surgery: none  Blood thinners: none      [Lower back] : lower back [4] : 4 [Radiating] : radiating [Sharp] : sharp [Throbbing] : throbbing [Intermittent] : intermittent [Household chores] : household chores [Nothing helps with pain getting better] : Nothing helps with pain getting better [Standing] : standing [Physical therapy] : physical therapy [] : no [FreeTextEntry7] : B/L LEG  [de-identified] : L MRI

## 2025-07-30 NOTE — DISCUSSION/SUMMARY
[de-identified] : After discussing various treatment options with the patient including but not limited to oral medications, physical therapy, exercise modalities as well as interventional spinal injections, we have decided with the following plan:  - Continue Home exercises, stretching, activity modification, physical therapy, and conservative care. - MRI report and/or images was reviewed and discussed with the patient. - Recommend L5-S1 Lumbar Epidural Steroid Injection under fluoroscopic guidance with image. (left)  - The risks, benefits and alternatives of the proposed procedure were explained in detail with the patient. The risks outlined include but are not limited to infection, bleeding, post-dural puncture headache, nerve injury, a temporary increase in pain, failure to resolve symptoms, allergic reaction, symptom recurrence, and possible elevation of blood sugar in diabetics. All questions were answered to patient's apparent satisfaction and he/she verbalized an understanding. - Patient is presenting with acute/sub-acute radicular pain with impairment in ADLs and functionality.  The pain has not responded to conservative care including NSAID therapy and/or physical therapy.  There is no bleeding tendency, unstable medical condition, or systemic infection. - Follow up in 1-2 weeks post injection for re-evaluation.